# Patient Record
Sex: FEMALE | Race: OTHER
[De-identification: names, ages, dates, MRNs, and addresses within clinical notes are randomized per-mention and may not be internally consistent; named-entity substitution may affect disease eponyms.]

---

## 2021-12-20 ENCOUNTER — HOSPITAL ENCOUNTER (EMERGENCY)
Dept: HOSPITAL 54 - ER | Age: 81
Discharge: HOME | End: 2021-12-20
Payer: MEDICARE

## 2021-12-20 VITALS — SYSTOLIC BLOOD PRESSURE: 154 MMHG | DIASTOLIC BLOOD PRESSURE: 89 MMHG

## 2021-12-20 VITALS — BODY MASS INDEX: 27.19 KG/M2 | WEIGHT: 144 LBS | HEIGHT: 61 IN
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
## 2021-12-20 DIAGNOSIS — N18.6: ICD-10-CM
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
## 2021-12-20 DIAGNOSIS — S22.42XA: Primary | ICD-10-CM

## 2021-12-20 DIAGNOSIS — Z99.2: ICD-10-CM
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
## 2021-12-20 DIAGNOSIS — S42.255A: ICD-10-CM

## 2021-12-20 DIAGNOSIS — S80.02XA: ICD-10-CM

## 2021-12-20 DIAGNOSIS — I12.0: ICD-10-CM

## 2021-12-20 DIAGNOSIS — S00.83XA: ICD-10-CM

## 2021-12-20 DIAGNOSIS — E11.22: ICD-10-CM
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
## 2021-12-20 PROCEDURE — 96374 THER/PROPH/DIAG INJ IV PUSH: CPT

## 2021-12-20 PROCEDURE — 70486 CT MAXILLOFACIAL W/O DYE: CPT

## 2021-12-20 PROCEDURE — 73030 X-RAY EXAM OF SHOULDER: CPT

## 2021-12-20 PROCEDURE — 73564 X-RAY EXAM KNEE 4 OR MORE: CPT

## 2021-12-20 PROCEDURE — 70450 CT HEAD/BRAIN W/O DYE: CPT

## 2021-12-20 PROCEDURE — 99284 EMERGENCY DEPT VISIT MOD MDM: CPT

## 2021-12-20 PROCEDURE — 72170 X-RAY EXAM OF PELVIS: CPT

## 2021-12-20 PROCEDURE — 96375 TX/PRO/DX INJ NEW DRUG ADDON: CPT

## 2021-12-20 PROCEDURE — 71250 CT THORAX DX C-: CPT

## 2021-12-20 PROCEDURE — 72125 CT NECK SPINE W/O DYE: CPT

## 2021-12-20 NOTE — NUR
Patient discharged to home in stable condition. RX Written and verbal after 
care instructions given. Patient AND DAUGHTER verbalizes understanding of 
instruction. PT DC TO HOME VIA AMBULANCE.

## 2021-12-20 NOTE — NUR
The patient is [id number], from home, c/o left knee, shoulder and and face pain 
s/p tripped and fall, no loc, 10/10 ps, hit her face on a tile floor. The 
patient is alert and oriented x3. In room air and denies SOB. Respiration 
regular and unlabored. Warm blanket provided for comfort. Will continue to 
monitor the patient.